# Patient Record
Sex: MALE | Race: BLACK OR AFRICAN AMERICAN | NOT HISPANIC OR LATINO
[De-identification: names, ages, dates, MRNs, and addresses within clinical notes are randomized per-mention and may not be internally consistent; named-entity substitution may affect disease eponyms.]

---

## 2021-09-13 ENCOUNTER — APPOINTMENT (OUTPATIENT)
Dept: SURGERY | Facility: CLINIC | Age: 31
End: 2021-09-13
Payer: COMMERCIAL

## 2021-09-13 DIAGNOSIS — Z78.9 OTHER SPECIFIED HEALTH STATUS: ICD-10-CM

## 2021-09-13 PROCEDURE — 99244 OFF/OP CNSLTJ NEW/EST MOD 40: CPT

## 2021-09-13 NOTE — PHYSICAL EXAM
[Midline] : located in midline position [Normal] : orientation to person, place, and time: normal [de-identified] : 2 x 2 cm scalp mass at central parietal area on top of head, non tender

## 2021-09-13 NOTE — HISTORY OF PRESENT ILLNESS
[de-identified] : 31 yr old male ,in good health, presents with asymptomatic scalp mass noted about 6 months ago,\par No significant past medical history, no previous surgery,, no significant family history and on no current medications.

## 2021-09-13 NOTE — ASSESSMENT
[FreeTextEntry1] : scalp mass likely either lipoma or epidermal cyst,\par \par Discussed alternative approaches of either conservative observation versus elective excision at this time,\par Mass was noted 6 months ago ,is not symptomatic and has not grown in size.\par Proposed plan of management including possible treatment alternatives, pros & cons, and risks/benefits ratio discussed fully with patient and all questions answered to patient's satisfaction.\par Patient decided on observation understanding that should mass progressively enlarges os becomes symptomatic, excision will be indicated,\par Return in 6 months for reassessment or earlier PRN,\par Discussed also with patient's mother.\par \par

## 2022-03-14 ENCOUNTER — APPOINTMENT (OUTPATIENT)
Dept: SURGERY | Facility: CLINIC | Age: 32
End: 2022-03-14
Payer: COMMERCIAL

## 2022-03-14 DIAGNOSIS — R22.0 LOCALIZED SWELLING, MASS AND LUMP, HEAD: ICD-10-CM

## 2022-03-14 PROCEDURE — 99213 OFFICE O/P EST LOW 20 MIN: CPT

## 2022-03-14 NOTE — PHYSICAL EXAM
[Midline] : located in midline position [Normal] : orientation to person, place, and time: normal [FreeTextEntry2] : scalp mass in central parietal area 2 x 2 cm in size [de-identified] : 2 x 2 cm scalp mass at central parietal area on top of head, non tender

## 2022-03-14 NOTE — HISTORY OF PRESENT ILLNESS
[de-identified] : Scalp mass remains asymptomatic & not increased in size over past 6 months,\par No other complaints

## 2022-03-14 NOTE — ASSESSMENT
[FreeTextEntry1] : stable scalp mass, either lipoma or inclusion cyst, asymptomatic,\par discussed again choice of either elective excision vs conservative observation and since asymptomatic and astable in size, decided on observation,\par Proposed plan of management including possible treatment alternatives, pros & cons, and risks/benefits ratio discussed fully with patient and all questions answered to patient's satisfaction.\par Return in 1 year or PRN earlier,